# Patient Record
Sex: FEMALE | Race: WHITE | NOT HISPANIC OR LATINO | Employment: FULL TIME | URBAN - METROPOLITAN AREA
[De-identification: names, ages, dates, MRNs, and addresses within clinical notes are randomized per-mention and may not be internally consistent; named-entity substitution may affect disease eponyms.]

---

## 2017-10-26 ENCOUNTER — HISTORICAL (OUTPATIENT)
Dept: RADIOLOGY | Facility: HOSPITAL | Age: 42
End: 2017-10-26

## 2017-10-26 LAB
ALBUMIN SERPL-MCNC: 3.3 GM/DL (ref 3.4–5)
ALBUMIN/GLOB SERPL: 1 {RATIO}
ALP SERPL-CCNC: 82 UNIT/L (ref 38–126)
ALT SERPL-CCNC: 18 UNIT/L (ref 12–78)
AST SERPL-CCNC: 8 UNIT/L (ref 15–37)
BILIRUB SERPL-MCNC: 0.3 MG/DL (ref 0.2–1)
BILIRUBIN DIRECT+TOT PNL SERPL-MCNC: 0.1 MG/DL (ref 0–0.2)
BILIRUBIN DIRECT+TOT PNL SERPL-MCNC: 0.2 MG/DL (ref 0–0.8)
BUN SERPL-MCNC: 16 MG/DL (ref 7–18)
CALCIUM SERPL-MCNC: 8.4 MG/DL (ref 8.5–10.1)
CHLORIDE SERPL-SCNC: 103 MMOL/L (ref 98–107)
CHOLEST SERPL-MCNC: 146 MG/DL (ref 0–200)
CHOLEST/HDLC SERPL: 2.7 {RATIO} (ref 0–4)
CO2 SERPL-SCNC: 26 MMOL/L (ref 21–32)
CREAT SERPL-MCNC: 0.9 MG/DL (ref 0.55–1.02)
ERYTHROCYTE [DISTWIDTH] IN BLOOD BY AUTOMATED COUNT: 12.5 % (ref 11.5–17)
GLOBULIN SER-MCNC: 3.4 GM/DL (ref 2.4–3.5)
GLUCOSE SERPL-MCNC: 108 MG/DL (ref 74–106)
HCT VFR BLD AUTO: 46 % (ref 37–47)
HDLC SERPL-MCNC: 54 MG/DL (ref 35–60)
HGB BLD-MCNC: 15.1 GM/DL (ref 12–16)
LDLC SERPL CALC-MCNC: 56 MG/DL (ref 0–129)
MCH RBC QN AUTO: 28.7 PG (ref 27–31)
MCHC RBC AUTO-ENTMCNC: 32.8 GM/DL (ref 33–36)
MCV RBC AUTO: 87.5 FL (ref 80–94)
PLATELET # BLD AUTO: 312 X10(3)/MCL (ref 130–400)
PMV BLD AUTO: 9 FL (ref 9.4–12.4)
POTASSIUM SERPL-SCNC: 4.7 MMOL/L (ref 3.5–5.1)
PROT SERPL-MCNC: 6.7 GM/DL (ref 6.4–8.2)
RBC # BLD AUTO: 5.26 X10(6)/MCL (ref 4.2–5.4)
SODIUM SERPL-SCNC: 139 MMOL/L (ref 136–145)
TRIGL SERPL-MCNC: 178 MG/DL (ref 30–150)
TSH SERPL-ACNC: 3.14 MIU/ML (ref 0.36–3.74)
VLDLC SERPL CALC-MCNC: 36 MG/DL
WBC # SPEC AUTO: 8 X10(3)/MCL (ref 4.5–11.5)

## 2019-01-03 ENCOUNTER — HISTORICAL (OUTPATIENT)
Dept: RADIOLOGY | Facility: HOSPITAL | Age: 44
End: 2019-01-03

## 2019-01-14 ENCOUNTER — HISTORICAL (OUTPATIENT)
Dept: RADIOLOGY | Facility: HOSPITAL | Age: 44
End: 2019-01-14

## 2019-01-29 ENCOUNTER — HISTORICAL (OUTPATIENT)
Dept: RADIOLOGY | Facility: HOSPITAL | Age: 44
End: 2019-01-29

## 2019-07-15 ENCOUNTER — HISTORICAL (OUTPATIENT)
Dept: RADIOLOGY | Facility: HOSPITAL | Age: 44
End: 2019-07-15

## 2021-03-23 ENCOUNTER — HISTORICAL (OUTPATIENT)
Dept: RADIOLOGY | Facility: HOSPITAL | Age: 46
End: 2021-03-23

## 2021-09-23 ENCOUNTER — HISTORICAL (OUTPATIENT)
Dept: ADMINISTRATIVE | Facility: HOSPITAL | Age: 46
End: 2021-09-23

## 2021-10-18 LAB
PAP RECOMMENDATION EXT: NORMAL
PAP SMEAR: NORMAL

## 2022-04-10 ENCOUNTER — HISTORICAL (OUTPATIENT)
Dept: ADMINISTRATIVE | Facility: HOSPITAL | Age: 47
End: 2022-04-10

## 2022-04-28 VITALS
BODY MASS INDEX: 34.97 KG/M2 | SYSTOLIC BLOOD PRESSURE: 132 MMHG | WEIGHT: 236.13 LBS | DIASTOLIC BLOOD PRESSURE: 85 MMHG | HEIGHT: 69 IN

## 2022-10-24 DIAGNOSIS — Z12.31 ENCOUNTER FOR SCREENING MAMMOGRAM FOR BREAST CANCER: Primary | ICD-10-CM

## 2022-11-01 ENCOUNTER — HOSPITAL ENCOUNTER (OUTPATIENT)
Dept: RADIOLOGY | Facility: HOSPITAL | Age: 47
Discharge: HOME | End: 2022-11-01
Attending: OBSTETRICS & GYNECOLOGY
Payer: COMMERCIAL

## 2022-11-01 DIAGNOSIS — Z12.31 ENCOUNTER FOR SCREENING MAMMOGRAM FOR BREAST CANCER: ICD-10-CM

## 2022-11-01 PROCEDURE — 77063 BREAST TOMOSYNTHESIS BI: CPT | Mod: 26,,, | Performed by: RADIOLOGY

## 2022-11-01 PROCEDURE — 77063 MAMMO DIGITAL SCREENING BILAT WITH TOMO: ICD-10-PCS | Mod: 26,,, | Performed by: RADIOLOGY

## 2022-11-01 PROCEDURE — 77067 SCR MAMMO BI INCL CAD: CPT | Mod: TC

## 2022-11-01 PROCEDURE — 77067 SCR MAMMO BI INCL CAD: CPT | Mod: 26,,, | Performed by: RADIOLOGY

## 2022-11-01 PROCEDURE — 77067 MAMMO DIGITAL SCREENING BILAT WITH TOMO: ICD-10-PCS | Mod: 26,,, | Performed by: RADIOLOGY

## 2022-11-09 PROBLEM — K21.9 GERD (GASTROESOPHAGEAL REFLUX DISEASE): Status: ACTIVE | Noted: 2022-11-09

## 2022-11-09 PROBLEM — E66.812 CLASS 2 OBESITY WITH BODY MASS INDEX (BMI) OF 35.0 TO 35.9 IN ADULT: Status: ACTIVE | Noted: 2022-11-09

## 2022-11-09 PROBLEM — M50.30 DDD (DEGENERATIVE DISC DISEASE), CERVICAL: Status: ACTIVE | Noted: 2022-11-09

## 2022-11-09 PROBLEM — J30.9 AR (ALLERGIC RHINITIS): Status: ACTIVE | Noted: 2022-11-09

## 2022-11-09 PROBLEM — E66.9 CLASS 2 OBESITY WITH BODY MASS INDEX (BMI) OF 35.0 TO 35.9 IN ADULT: Status: ACTIVE | Noted: 2022-11-09

## 2023-01-07 ENCOUNTER — DOCUMENTATION ONLY (OUTPATIENT)
Dept: FAMILY MEDICINE | Facility: CLINIC | Age: 48
End: 2023-01-07
Payer: COMMERCIAL

## 2023-12-20 ENCOUNTER — HOSPITAL ENCOUNTER (OUTPATIENT)
Dept: RADIOLOGY | Facility: HOSPITAL | Age: 48
Discharge: HOME | End: 2023-12-20
Attending: OBSTETRICS & GYNECOLOGY
Payer: COMMERCIAL

## 2023-12-20 DIAGNOSIS — Z12.31 ENCOUNTER FOR SCREENING MAMMOGRAM FOR BREAST CANCER: ICD-10-CM

## 2023-12-20 PROCEDURE — 77063 MAMMO DIGITAL SCREENING BILAT WITH TOMO: ICD-10-PCS | Mod: 26,,, | Performed by: RADIOLOGY

## 2023-12-20 PROCEDURE — 77067 SCR MAMMO BI INCL CAD: CPT | Mod: 26,,, | Performed by: RADIOLOGY

## 2023-12-20 PROCEDURE — 77063 BREAST TOMOSYNTHESIS BI: CPT | Mod: 26,,, | Performed by: RADIOLOGY

## 2023-12-20 PROCEDURE — 77067 MAMMO DIGITAL SCREENING BILAT WITH TOMO: ICD-10-PCS | Mod: 26,,, | Performed by: RADIOLOGY

## 2023-12-20 PROCEDURE — 77067 SCR MAMMO BI INCL CAD: CPT | Mod: TC

## 2024-02-15 ENCOUNTER — OFFICE VISIT (OUTPATIENT)
Dept: SURGERY | Facility: CLINIC | Age: 49
End: 2024-02-15
Payer: COMMERCIAL

## 2024-02-15 VITALS
WEIGHT: 250.81 LBS | HEIGHT: 69 IN | HEART RATE: 80 BPM | BODY MASS INDEX: 37.15 KG/M2 | SYSTOLIC BLOOD PRESSURE: 116 MMHG | DIASTOLIC BLOOD PRESSURE: 76 MMHG

## 2024-02-15 DIAGNOSIS — K80.20 CALCULUS OF GALLBLADDER WITHOUT CHOLECYSTITIS WITHOUT OBSTRUCTION: Primary | ICD-10-CM

## 2024-02-15 PROCEDURE — 3008F BODY MASS INDEX DOCD: CPT | Mod: CPTII,,, | Performed by: NURSE PRACTITIONER

## 2024-02-15 PROCEDURE — 99204 OFFICE O/P NEW MOD 45 MIN: CPT | Mod: ,,, | Performed by: NURSE PRACTITIONER

## 2024-02-15 PROCEDURE — 1159F MED LIST DOCD IN RCRD: CPT | Mod: CPTII,,, | Performed by: NURSE PRACTITIONER

## 2024-02-15 PROCEDURE — 3074F SYST BP LT 130 MM HG: CPT | Mod: CPTII,,, | Performed by: NURSE PRACTITIONER

## 2024-02-15 PROCEDURE — 3078F DIAST BP <80 MM HG: CPT | Mod: CPTII,,, | Performed by: NURSE PRACTITIONER

## 2024-02-15 RX ORDER — CETIRIZINE HYDROCHLORIDE 10 MG/1
10 TABLET ORAL DAILY
COMMUNITY

## 2024-02-15 NOTE — PROGRESS NOTES
[facility] - General Surgery Services  [address] [place] [id number]  Phone: [phone]  Fax: [phone]     HISTORY & PHYSICAL  General Surgery  Dr. Jerel Ingram      Patient Name: Eugenio Siddiqui  YOB: 1975  Author: SHELLIE Rizo     Date: 02/15/2024                   SUBJECTIVE:     Chief Complaint/Reason for Admission:   Chief Complaint   Patient presents with    Consult     Calculus of gallbladder        History of Present Illness:  Ms. Eugenio Siddiqui is a 48 y.o. female who presents to clinic for surgical evaluation of gallbladder diease. Workup revealed cholelithiasis.     Pt reports 2.5 weeks ago she developed intermittent periumbilical stabbing pain. No relation to meals. Pain transitioned over the past 2 weeks to generalized abdominal pain and now occurs to RUQ  and travels down to RLQ.   Primary complaint is intermittent nausea. This symptom has worsened and now occurs daily.   She reports she has tried to follow a lower fat diet over the past 2 weeks to limit symptoms.   Associated with belching, worsening heartburn/reflux symptoms, and increase frequency of bowel movements. Normally has 1 BM per day but recently has notice 2-3 bowel movements per day, no diarrhea, constipation, or BRBPR.   No fever.   Normally takes GERD med Prn but recently had to start taking daily.     Positive symptoms:   Abdominal Pain RUQ and RLQ, Nausea, Belching, Heartburn, and Dyspepsia    Referring provider: Ayanna Ca NP     Patient Care Team:  ALVAREZ Anderson Jr., MD as PCP - General (Family Medicine)  Jerel Ingram MD as Surgeon (General Surgery)     Pertinent workup:  Abd US: gallstones    US Abdomen Complete  Narrative: EXAMINATION:  US ABDOMEN COMPLETE    CLINICAL HISTORY:  Generalized abdominal pain    TECHNIQUE:  Complete abdominal ultrasound (including pancreas, aorta, liver, gallbladder, common bile duct, IVC, kidneys, and  spleen) was performed.    COMPARISON:  None    FINDINGS:  The visualized portions of the pancreas, aorta and IVC are unremarkable.    The liver is echogenic and measures 14 cm.  No focal hepatic mass.  No intrahepatic ductal dilation.  The common bile duct measures 3 mm.  There are multiple small stones in the gallbladder.  No wall thickening or pericholecystic fluid.  The visualized right and left kidneys are unremarkable.  The spleen measures 11 cm.  Impression: Cholelithiasis.    Hepatic steatosis.    Electronically signed by: Norris Del Real  Date:    2024  Time:    09:59      Review of Systems:  12 point ROS negative except as stated in HPI    PAST HISTORY:     Past Medical History:   Diagnosis Date    GERD (gastroesophageal reflux disease)     It would only happen every now and then but nownis allost daily     Past Surgical History:   Procedure Laterality Date    neck sx   2021    SPINE SURGERY  Dec 8, 2021    Neck fusion surgery     Family History   Problem Relation Age of Onset    Cirrhosis Mother     Fibromyalgia Mother     Cancer Father         Skin    Schizophrenia Sister     Depression Sister         Manic depressent    Depression Brother         Bipolar     Social History     Socioeconomic History    Marital status:     Number of children: 2   Tobacco Use    Smoking status: Former     Current packs/day: 0.00     Types: Cigarettes     Quit date: 2003     Years since quittin.7    Smokeless tobacco: Never   Substance and Sexual Activity    Alcohol use: Yes     Comment: occasionally    Drug use: Never    Sexual activity: Yes     Partners: Male     Birth control/protection: Partner-Vasectomy     Social Determinants of Health     Financial Resource Strain: Low Risk  (2024)    Overall Financial Resource Strain (CARDIA)     Difficulty of Paying Living Expenses: Not hard at all   Food Insecurity: No Food Insecurity (2024)    Hunger Vital Sign     Worried About Running Out of  Food in the Last Year: Never true     Ran Out of Food in the Last Year: Never true   Transportation Needs: No Transportation Needs (2/14/2024)    PRAPARE - Transportation     Lack of Transportation (Medical): No     Lack of Transportation (Non-Medical): No   Physical Activity: Insufficiently Active (2/14/2024)    Exercise Vital Sign     Days of Exercise per Week: 3 days     Minutes of Exercise per Session: 30 min   Stress: No Stress Concern Present (2/14/2024)    [place] [employer] - Occupational Stress Questionnaire     Feeling of Stress : Not at all   Social Connections: Unknown (2/14/2024)    Social Connection and Isolation Panel [NHANES]     Frequency of Communication with Friends and Family: More than three times a week     Frequency of Social Gatherings with Friends and Family: More than three times a week     Active Member of Clubs or Organizations: Yes     Attends Club or Organization Meetings: More than 4 times per year     Marital Status:    Housing Stability: Low Risk  (2/14/2024)    Housing Stability Vital Sign     Unable to Pay for Housing in the Last Year: No     Number of Places Lived in the Last Year: 1     Unstable Housing in the Last Year: No       MEDICATIONS & ALLERGIES:     Current Outpatient Medications on File Prior to Visit   Medication Sig    azelastine (ASTELIN) 137 mcg (0.1 %) nasal spray USE 2 SPRAYS IN EACH NOSTRIL TWICE DAILY IN EACH NOSTRIL    cetirizine (ZYRTEC) 10 MG tablet Take 10 mg by mouth once daily.    cyclobenzaprine (FLEXERIL) 5 MG tablet Take 1 to 2 Tab(s) Oral up to every 8 hours as needed for Spasm    famotidine (PEPCID) 40 MG tablet     fluticasone propionate (FLONASE) 50 mcg/actuation nasal spray 1-2 sprays ( mcg total) by Each Nostril route once daily.    traMADoL (ULTRAM) 50 mg tablet Take 1 tablet (50 mg total) by mouth every 6 (six) hours.    [DISCONTINUED] montelukast (SINGULAIR) 10 mg tablet      No current facility-administered  "medications on file prior to visit.     Review of patient's allergies indicates:  No Known Allergies    OBJECTIVE:     Vitals:    02/15/24 0937   BP: 116/76   BP Location: Left arm   Patient Position: Sitting   Pulse: 80   Weight: 113.8 kg (250 lb 12.8 oz)   Height: 5' 9" (1.753 m)     Body mass index is 37.04 kg/m².    Physical Exam:  General:  Well developed, well nourished, no acute distress  HEENT:  Normocephalic, atraumatic, PERRL, EOMI, clear sclera, neck supple  CVS:  RRR, S1 and S2 normal, no murmurs, rubs, gallops  Resp:  Lungs clear to auscultation, no wheezes, rales, rhonchi, cough  GI:  Abdomen soft, non-tender, non-distended, normoactive bowel sounds, no masses  :   Deferred  MSK:  No muscle atrophy, cyanosis, peripheral edema, full range of motion  Skin:  No rashes, ulcers, erythema  Neuro:  CNII-XII grossly intact  Psych:  Alert and oriented to person, place, and time    Results:  I have independently reviewed all pertinent lab and radiologic studies relevant to general surgery.      VISIT DIAGNOSES:       ICD-10-CM ICD-9-CM   1. Calculus of gallbladder without cholecystitis without obstruction  K80.20 574.20       ASSESSMENT/PLAN:   Ms. Eugenio Siddiqui is a 48 y.o. female with symptomatic cholelithiasis.     Plan:  - Laparoscopic Cholecystectomy   - Pt requests a Friday due to work schedule.   - Recommended strict low fat diet until surgery to prevent attacks of pain. Call office ASAP if symptoms worsen.   - Possible Intraoperative Cholangiogram, Possible Hepatic Biopsy, and other indicated procedures. Operative findings to dictate procedures performed.   - Pre op labs from PCP. No additional pre op workup needed.  - Examined patient, discussed recommendations, discussed risks/benefits of procedure, obtained informed consent, and answered all questions.   - Dr. Ingram to meet patient AM of surgery and obtain informed consent.  - Counseling included differential diagnoses, treatment options, " risks and benefits, lifestyle changes, prognosis, and medications.    The patient was interactive, and verbalized understanding.    Kayleen Rodas, ANP

## 2024-02-19 DIAGNOSIS — K80.20 CALCULUS OF GALLBLADDER WITHOUT CHOLECYSTITIS WITHOUT OBSTRUCTION: Primary | ICD-10-CM

## 2024-03-27 NOTE — PROGRESS NOTES
Patient ID: [id number]     Chief Complaint: Post-op Evaluation (Lap rupali 1524)    HPI:     Eugenio Siddiqui is a 48 y.o. female here today for postoperative visit of lap cholecystectomy on 3/15/2024. Pt's incisions are healing well, denies any abd pain. having normal bowel movements, passing flatus, no NVD. no constipation. Reports reflux is even better but could be due to change in diet postop.     FINAL DIAGNOSIS      GALLBLADDER, CHOLECYSTECTOMY:      CHRONIC CHOLECYSTITIS WITH CHOLELITHIASIS.     Pathology Results  (Last 10 years)                 03/15/24 1104  Specimen to Pathology Final result            Patient Care Team:  ALVAREZ Anderson Jr., MD as PCP - General (Family Medicine)  Jerel Ingram MD as Surgeon (General Surgery)     Past Medical History:   Diagnosis Date    GERD (gastroesophageal reflux disease)     It would only happen every now and then but nownis allost daily        Past Surgical History:   Procedure Laterality Date    LAPAROSCOPIC CHOLECYSTECTOMY N/A 3/15/2024    Procedure: CHOLECYSTECTOMY, LAPAROSCOPIC;  Surgeon: Jerel Ingram MD;  Location: [facility];  Service: General;  Laterality: N/A;    neck sx   2021    SPINE SURGERY  Dec 8, 2021    Neck fusion surgery        Social History     Tobacco Use    Smoking status: Former     Current packs/day: 0.00     Types: Cigarettes     Quit date: 2003     Years since quittin.8    Smokeless tobacco: Never   Substance and Sexual Activity    Alcohol use: Yes     Comment: occasionally    Drug use: Never    Sexual activity: Yes     Partners: Male     Birth control/protection: Partner-Vasectomy        Current Outpatient Medications   Medication Instructions    azelastine (ASTELIN) 137 mcg (0.1 %) nasal spray USE 2 SPRAYS IN EACH NOSTRIL TWICE DAILY IN EACH NOSTRIL    cetirizine (ZYRTEC) 10 mg, Oral, Daily    cyclobenzaprine (FLEXERIL) 5 MG tablet Take 1 to 2 Tab(s) Oral up to every 8 hours as needed for Spasm    famotidine  "(PEPCID) 40 MG tablet No dose, route, or frequency recorded.    fluticasone propionate (FLONASE)  mcg, Each Nostril, Daily    HYDROcodone-acetaminophen (NORCO) 7.5-325 mg per tablet 1 tablet, Oral, Every 6 hours PRN    traMADoL (ULTRAM) 50 mg, Oral, Every 6 hours       Review of patient's allergies indicates:  No Known Allergies     Subjective:     Review of Systems    12 point review of systems conducted, negative except as stated in the history of present illness. See HPI for details.    Objective:     Visit Vitals  /86   Pulse 93   Ht 5' 9" (1.753 m)   Wt 113.4 kg (250 lb)   LMP  (LMP Unknown)   BMI 36.92 kg/m²       Physical Exam:  General:  Alert and oriented.    Respiratory:  Lungs are clear to auscultation, Respirations are non-labored, Breath sounds are equal.    Cardiovascular:  Normal rate, Regular rhythm, No murmur.    Gastrointestinal:  Soft, Non-tender, Non-distended, Normal bowel sounds        Musculoskeletal:  Normal range of motion, Normal strength.    Integumentary:  Warm, Dry, Pink.  Lap incisions healing well, no redness, swelling, or drainage noted.   Neurologic:  Alert, Oriented.    Psychiatric:  Cooperative.      Assessment:     1. Postoperative visit            Plan:     1. Postoperative visit       - numbness across mid abd should continue to get better with time but it will take time for the feeling to come back, this is due to nerve inflammation.   - Incisions healing well   - ok to take adhesive off with adhesive remover   - ok to get in tub, swim, wash with any soap, and still no lifting >15# x's 2 weeks.  - ok to advance diet as tolerated   - ED precautions given to patient and is to call the office immediately or go to the emergency room if she notices any redness, swelling, severe pain, increase nausea/vomiting, fever, irregular bowel movements or severe diarrhea      No future appointments.       KATHERINE Alanis      "

## 2024-03-28 ENCOUNTER — OFFICE VISIT (OUTPATIENT)
Dept: SURGERY | Facility: CLINIC | Age: 49
End: 2024-03-28
Payer: COMMERCIAL

## 2024-03-28 VITALS
HEART RATE: 93 BPM | WEIGHT: 250 LBS | BODY MASS INDEX: 37.03 KG/M2 | DIASTOLIC BLOOD PRESSURE: 86 MMHG | SYSTOLIC BLOOD PRESSURE: 121 MMHG | HEIGHT: 69 IN

## 2024-03-28 DIAGNOSIS — Z48.89 POSTOPERATIVE VISIT: Primary | ICD-10-CM

## 2024-03-28 PROCEDURE — 99024 POSTOP FOLLOW-UP VISIT: CPT | Mod: ,,, | Performed by: NURSE PRACTITIONER

## 2024-03-28 PROCEDURE — 3074F SYST BP LT 130 MM HG: CPT | Mod: CPTII,,, | Performed by: NURSE PRACTITIONER

## 2024-03-28 PROCEDURE — 1159F MED LIST DOCD IN RCRD: CPT | Mod: CPTII,,, | Performed by: NURSE PRACTITIONER

## 2024-03-28 PROCEDURE — 3079F DIAST BP 80-89 MM HG: CPT | Mod: CPTII,,, | Performed by: NURSE PRACTITIONER

## 2024-08-23 ENCOUNTER — APPOINTMENT (OUTPATIENT)
Dept: LAB | Facility: HOSPITAL | Age: 49
End: 2024-08-23
Attending: OBSTETRICS & GYNECOLOGY
Payer: COMMERCIAL

## 2024-08-23 DIAGNOSIS — Z00.00 ROUTINE GENERAL MEDICAL EXAMINATION AT A HEALTH CARE FACILITY: Primary | ICD-10-CM

## 2024-08-23 DIAGNOSIS — E55.9 AVITAMINOSIS D: ICD-10-CM

## 2024-08-23 LAB
25(OH)D3+25(OH)D2 SERPL-MCNC: 44 NG/ML (ref 30–80)
ALBUMIN SERPL-MCNC: 3.9 G/DL (ref 3.5–5)
ALBUMIN/GLOB SERPL: 1.3 RATIO (ref 1.1–2)
ALP SERPL-CCNC: 84 UNIT/L (ref 40–150)
ALT SERPL-CCNC: 23 UNIT/L (ref 0–55)
ANION GAP SERPL CALC-SCNC: 8 MEQ/L
AST SERPL-CCNC: 16 UNIT/L (ref 5–34)
BASOPHILS # BLD AUTO: 0.09 X10(3)/MCL
BASOPHILS NFR BLD AUTO: 1 %
BILIRUB SERPL-MCNC: 0.5 MG/DL
BUN SERPL-MCNC: 12.7 MG/DL (ref 7–18.7)
CALCIUM SERPL-MCNC: 9.5 MG/DL (ref 8.4–10.2)
CHLORIDE SERPL-SCNC: 103 MMOL/L (ref 98–107)
CHOLEST SERPL-MCNC: 184 MG/DL
CHOLEST/HDLC SERPL: 4 {RATIO} (ref 0–5)
CO2 SERPL-SCNC: 25 MMOL/L (ref 22–29)
CREAT SERPL-MCNC: 0.86 MG/DL (ref 0.55–1.02)
CREAT/UREA NIT SERPL: 15
EOSINOPHIL # BLD AUTO: 0.24 X10(3)/MCL (ref 0–0.9)
EOSINOPHIL NFR BLD AUTO: 2.7 %
ERYTHROCYTE [DISTWIDTH] IN BLOOD BY AUTOMATED COUNT: 13.1 % (ref 11.5–17)
ESTRADIOL SERPL HS-MCNC: 216 PG/ML
GFR SERPLBLD CREATININE-BSD FMLA CKD-EPI: >60 ML/MIN/1.73/M2
GLOBULIN SER-MCNC: 3 GM/DL (ref 2.4–3.5)
GLUCOSE SERPL-MCNC: 107 MG/DL (ref 74–100)
HCT VFR BLD AUTO: 45.6 % (ref 37–47)
HDLC SERPL-MCNC: 45 MG/DL (ref 35–60)
HGB BLD-MCNC: 15 G/DL (ref 12–16)
IMM GRANULOCYTES # BLD AUTO: 0.03 X10(3)/MCL (ref 0–0.04)
IMM GRANULOCYTES NFR BLD AUTO: 0.3 %
LDLC SERPL CALC-MCNC: 109 MG/DL (ref 50–140)
LYMPHOCYTES # BLD AUTO: 2.87 X10(3)/MCL (ref 0.6–4.6)
LYMPHOCYTES NFR BLD AUTO: 32.1 %
MCH RBC QN AUTO: 28.3 PG (ref 27–31)
MCHC RBC AUTO-ENTMCNC: 32.9 G/DL (ref 33–36)
MCV RBC AUTO: 86 FL (ref 80–94)
MONOCYTES # BLD AUTO: 0.59 X10(3)/MCL (ref 0.1–1.3)
MONOCYTES NFR BLD AUTO: 6.6 %
NEUTROPHILS # BLD AUTO: 5.13 X10(3)/MCL (ref 2.1–9.2)
NEUTROPHILS NFR BLD AUTO: 57.3 %
NRBC BLD AUTO-RTO: 0 %
PLATELET # BLD AUTO: 337 X10(3)/MCL (ref 130–400)
PMV BLD AUTO: 9.3 FL (ref 7.4–10.4)
POTASSIUM SERPL-SCNC: 4.3 MMOL/L (ref 3.5–5.1)
PROT SERPL-MCNC: 6.9 GM/DL (ref 6.4–8.3)
RBC # BLD AUTO: 5.3 X10(6)/MCL (ref 4.2–5.4)
SODIUM SERPL-SCNC: 136 MMOL/L (ref 136–145)
T4 FREE SERPL-MCNC: 0.76 NG/DL (ref 0.7–1.48)
TRIGL SERPL-MCNC: 151 MG/DL (ref 37–140)
TSH SERPL-ACNC: 2.27 UIU/ML (ref 0.35–4.94)
VLDLC SERPL CALC-MCNC: 30 MG/DL
WBC # BLD AUTO: 8.95 X10(3)/MCL (ref 4.5–11.5)

## 2024-08-23 PROCEDURE — 80053 COMPREHEN METABOLIC PANEL: CPT

## 2024-08-23 PROCEDURE — 82670 ASSAY OF TOTAL ESTRADIOL: CPT

## 2024-08-23 PROCEDURE — 85025 COMPLETE CBC W/AUTO DIFF WBC: CPT

## 2024-08-23 PROCEDURE — 82306 VITAMIN D 25 HYDROXY: CPT

## 2024-08-23 PROCEDURE — 36415 COLL VENOUS BLD VENIPUNCTURE: CPT

## 2024-08-23 PROCEDURE — 80061 LIPID PANEL: CPT

## 2024-08-23 PROCEDURE — 84443 ASSAY THYROID STIM HORMONE: CPT

## 2024-08-23 PROCEDURE — 84439 ASSAY OF FREE THYROXINE: CPT

## 2025-02-13 DIAGNOSIS — Z12.31 OTHER SCREENING MAMMOGRAM: Primary | ICD-10-CM

## 2025-02-18 ENCOUNTER — TELEPHONE (OUTPATIENT)
Dept: NEUROSURGERY | Facility: CLINIC | Age: 50
End: 2025-02-18
Payer: COMMERCIAL

## 2025-02-18 ENCOUNTER — HOSPITAL ENCOUNTER (OUTPATIENT)
Dept: RADIOLOGY | Facility: HOSPITAL | Age: 50
Discharge: HOME | End: 2025-02-18
Attending: OBSTETRICS & GYNECOLOGY
Payer: COMMERCIAL

## 2025-02-18 DIAGNOSIS — M51.362 DEGENERATION OF INTERVERTEBRAL DISC OF LUMBAR REGION WITH DISCOGENIC BACK PAIN AND LOWER EXTREMITY PAIN: Primary | ICD-10-CM

## 2025-02-18 DIAGNOSIS — Z12.31 OTHER SCREENING MAMMOGRAM: ICD-10-CM

## 2025-02-18 PROCEDURE — 77067 SCR MAMMO BI INCL CAD: CPT | Mod: TC

## 2025-02-18 NOTE — TELEPHONE ENCOUNTER
Please schedule the patient next available on either 's, my or Latoya's schedule.  I would recommend she attempted at least 1 injection with Dr. Richardson should he feel it warranted prior to her being seen in our clinic.  Thanks

## 2025-02-18 NOTE — TELEPHONE ENCOUNTER
"Patient was referred urgently to  by Dr. Victor Manuel Anderson on 02/18 for lumbar.    Per referring provider's note on 02/14/23:   "Chief Complaint: Back Pain  Patient presents here today for the above reason.    Back pain since October. Started after pulling an object. Re-injured in January. Has hx of bulging disc at L4-L5 dx 10 years ago. Went to PT for low back. Having Sciatica into Right leg down to her foot. Interested in having MRI of lumbar spine. Has appointment scheduled with Dr Richardson on 2/27/23  Currently on nabumetone 750 mg. Takes Flexeril and Tramadol.as needed.   Cervical Fusion on 12/8/2021."    Patient was treated with PT on and off since approx. 2023 with Ricky Barry.    Per referring provider's note on 01/28/25: "Pain in left leg and foot. PT has not been helping. Worse when sitting. MRI of L-Spine performed on 2/18/23. Low back pain with sciatica. Physical therapy has been ineffective. Will order an MRI of lumbar spine without contrast, follow-up to be determined."    Referring provider advised on 02/17/25: "Please advise that her MRI showed a synovial cyst at L4-L5 which causes left side stenosis. Has she seen a neurosurgeon? If not then I will place a referral, does she have a preference of provider?"    MRI lumbar performed on 02/13/25 reports: "Chronic 1-2 mm degenerative retrolisthesis of L3 with no acute malalignment or fracture.  Chronic lower thoracic and lumbar degenerative disc disease and facet arthrosis as detailed above with new synovial cyst at the medial aspect of the left L4-L5 facet joint which contributes to severe left lateral recess stenosis with possible impingement of the descending left L5 nerve.  Mild leftward spinal canal stenosis at L4-L5 with no high-grade spinal canal stenosis. Mild right neural foramen stenosis at L3-L4 and mild left neural foramen stenosis at L4-L5. Interval resolution of previously demonstrated small disc protrusion at L5-S1."    Provider- can " you please review imaging for severity and how to schedule?

## 2025-02-19 NOTE — TELEPHONE ENCOUNTER
Spoke with patient and she stated that she hasn't seen Dr. Richardson for her back, only her neck. She was told by Dr. Anderson that when the MRI revealed a cyst she should consult with neurosurgery instead of trying inj. She states that the pain is primarily in her LLE into the heel of her foot with numbness. It is the most prominent when she is waking up in the morning. She is taking Flexeril and Tramadol with minimal relief.

## 2025-03-11 NOTE — PROGRESS NOTES
[facility]  History & Physical  Neurosurgery      Eugenio Siddiqui   [id number]   1975     SUBJECTIVE:     CHIEF COMPLAINT:  Lower back and left leg pain      HPI:  Eugenio Siddiqui is a 49 y.o. female who presents for neurosurgical evaluation.  She has been referred to  by Dr. Victor Manuel Anderson in regards to her lumbar spine.  The patient began with lower back pain in October of 2022 due to the patient pulling on an object.  She re-injured the lower back in January 2023.  MRI of the lumbar spine without contrast was obtained on 2/13/2025.  At L4-5, there is left-sided lateral recess stenosis secondary to facet hypertrophy, ligamentous hypertrophy and synovial cyst.  The cyst displaces the left L5 nerve root.    The patient complains of lower back pain with pain radiating into the left posterior thigh, posterolateral lower leg, and plantar surface from the arch to heal.  Her pain increases with prolonged sitting and standing.  The symptoms are improved with stretching and exercises.  She points to the left SI joint as the area of pain.    In June of 2024, the patient was in [place].  She slept in a [place].  The following day, she sneezed and felt immediate pain in the lower back and left posterior leg.  Upon returning home, she underwent a course of physical therapy.  Due to persistent pain, the patient then underwent MRI of the lumbar spine.  Initially, the patient had weakness in the left leg.  However, this is no longer a problem.  Her symptoms have improved since the onset of pain.  She is not able to run.  She did [place] running through the airport.  She is able to participate in most activities she desires.  She is being very careful with lifting.  She was seen by Dr. Richardson.  She has not undergone injections.  She also has an appointment with Dr. Beau Irving's PA tomorrow.    In addition, the patient has a history of undergoing C4-5 ACDF on 12/18/2021 with Dr. Beau Jones.   She has experienced recurrent neck pain with pain radiating into the right trapezius muscle in the past 2-3 months.  She has a history of low back pain and right leg pain approximately 10 years ago.  She was found to have a herniated disc.  She improved with a course of physical therapy.  The patient denies disturbances in bowel or bladder function.  There is no difficulty with balance.       Past Medical History:   Diagnosis Date    Chronic midline low back pain with bilateral sciatica     Class 2 obesity due to excess calories without serious comorbidity with body mass index (BMI) of 35.0 to 35.9 in adult     GERD (gastroesophageal reflux disease) 2020    It would only happen every now and then but nownis allost daily    Hypertriglyceridemia        Past Surgical History:   Procedure Laterality Date    CERVICAL FUSION  2021    C4-5 ACDF.  Dr. Beau Jones    LAPAROSCOPIC CHOLECYSTECTOMY N/A 03/15/2024    Procedure: CHOLECYSTECTOMY, LAPAROSCOPIC;  Surgeon: Jerel Ingram MD;  Location: [facility];  Service: General;  Laterality: N/A;       Family History   Problem Relation Name Age of Onset    Cirrhosis Mother      Fibromyalgia Mother      Cancer Father Seymour         Skin    Schizophrenia Sister Scarlett     Depression Sister Scarlett         Manic depressent    Depression Brother Jensen         Bipolar       Social History     Socioeconomic History    Marital status:     Number of children: 2   Occupational History    Occupation: [employer]   Tobacco Use    Smoking status: Former     Current packs/day: 0.00     Types: Cigarettes     Quit date: 2003     Years since quittin.8    Smokeless tobacco: Never   Substance and Sexual Activity    Alcohol use: Yes     Comment: occasionally    Drug use: Never    Sexual activity: Yes     Partners: Male     Birth control/protection: Partner-Vasectomy     Social Drivers of Health     Financial Resource Strain: Low Risk  (2024)    Overall  Financial Resource Strain (CARDIA)     Difficulty of Paying Living Expenses: Not hard at all   Food Insecurity: No Food Insecurity (2/14/2024)    Hunger Vital Sign     Worried About Running Out of Food in the Last Year: Never true     Ran Out of Food in the Last Year: Never true   Transportation Needs: No Transportation Needs (2/14/2024)    PRAPARE - Transportation     Lack of Transportation (Medical): No     Lack of Transportation (Non-Medical): No   Physical Activity: Insufficiently Active (2/14/2024)    Exercise Vital Sign     Days of Exercise per Week: 3 days     Minutes of Exercise per Session: 30 min   Stress: No Stress Concern Present (2/14/2024)    [employer] - Occupational Stress Questionnaire     Feeling of Stress : Not at all   Housing Stability: Low Risk  (2/14/2024)    Housing Stability Vital Sign     Unable to Pay for Housing in the Last Year: No     Number of Places Lived in the Last Year: 1     Unstable Housing in the Last Year: No       Current Outpatient Medications   Medication Instructions    azelastine (ASTELIN) 274 mcg, Nasal, 2 times daily    cetirizine (ZYRTEC) 10 mg, Daily    cyclobenzaprine (FLEXERIL) 5 MG tablet Take 1 to 2 Tab(s) Oral up to every 8 hours as needed for Spasm    famotidine (PEPCID) 40 mg, Oral, Nightly PRN    fluticasone propionate (FLONASE)  mcg, Each Nostril, Daily    traMADoL (ULTRAM) 50 mg, Oral, Every 6 hours PRN       Review of patient's allergies indicates:  No Known Allergies       Review of Systems   Constitutional:  Negative for chills and fever.   HENT:  Negative for nosebleeds and sore throat.    Eyes:  Negative for pain and visual disturbance.   Respiratory:  Negative for cough, chest tightness and shortness of breath.    Cardiovascular:  Negative for chest pain.   Gastrointestinal:  Negative for diarrhea, nausea and vomiting.   Genitourinary:  Negative for difficulty urinating, dysuria and hematuria.   Musculoskeletal:   "Positive for back pain. Negative for gait problem and myalgias.   Skin:  Negative for rash.   Neurological:  Positive for numbness. Negative for dizziness, facial asymmetry, weakness and headaches.   Psychiatric/Behavioral:  Negative for confusion and sleep disturbance. The patient is not nervous/anxious.        OBJECTIVE:       Visit Vitals  /84 (BP Location: Right arm, Patient Position: Sitting)   Pulse 98   Resp 16   Ht 5' 9" (1.753 m)   Wt 108.7 kg (239 lb 9.6 oz)   BMI 35.38 kg/m²        General:  Pleasant, Well-groomed, overweight.    CV:  Neck is supple.  There are no carotid bruits.  Heart has regular rate and rhythm.    Lungs:  Lungs are clear to auscultation bilaterally with non-labored respirations.    Abdomen:  Soft, non-tender, non-distended    Musculoskeletal:   Lumbar ROM:  Full, lower back pain is increased with extension and rising from a flexed position..  Straight leg raise is negative bilaterally.  Crossed straight leg raise is negative bilaterally.  Masrha's test is negative bilaterally.  Hip rotation is negative bilaterally.    Neurological:  The patient is awake, alert, and oriented in all 4 spheres.  Muscle strength against resistance:  Strength  Deltoids Biceps Triceps Wrist Extension Wrist Flexion Intrinsics   Upper: R 5/5 5/5 5/5 5/5  5/5    L 5/5 5/5 5/5 5/5  5/5     Iliopsoas Quadriceps Knee  Flexion Tibialis  anterior Gastro- cnemius EHL   Lower: R 5/5 5/5 5/5 5/5 5/5 5/5    L 5/5 5/5 5/5 5/5 5/5 5/5   Sensation is intact to primary modalities in bilateral upper and lower extremities.  Bhat's sign is positive on the left, negative on the right.  Toes are downgoing to Babinski.  There is A1 be clonus on the left.  Reflexes:   Right Left   Triceps 3+ 2+   Biceps 3+ 2+   Brachioradialis 3+ 3+   Patellar 3+ 3+   Achilles 3+ 3+   Gait is normal.  She is able to walk on heels and toes without difficulty.  Walking on heels causes pain at her left heel.      Imaging:  All pertinent " neuroimaging independently reviewed. Discussed these findings in detail with the patient.      ASSESSMENT:     1. Synovial cyst of lumbar facet joint        2. Lumbar stenosis with neurogenic claudication        3. Degeneration of intervertebral disc of lumbar region with discogenic back pain and lower extremity pain  Ambulatory referral/consult to Neurosurgery      4. Cervical myelopathy with cervical radiculopathy          PLAN:     Options were discussed at length with the patient.  She is able to manage with her lower back and leg pain symptoms at this time.  With this being the case, she should continue with conservative measures rather than considering surgery at this time.  I think it would be reasonable to get backward Dr. Templeton seen and discuss proceeding with epidural injections.  She is not interested in surgery at this point.  She will continue with her core strengthening and stretching exercises.    I am concerned about her hyperreflexia.  She is unsure if she exhibited brisk reflexes prior to her cervical fusion.  She is to see RICKY Irving for Dr. Beau Jones, tomorrow.  I asked her to discuss this with him so that he can look back at his notes and see if she was hyperreflexive at that time.  If she was not, we need to move forward with obtaining cervical MRI to assess for spinal cord compression.  If she was hyperreflexive, we do not need to proceed with MRI at this time.  She will also likely obtain lumbar x-rays with flexion-extension views tomorrow I have asked her to bring that by the office so I can review.  She will contact me to discuss her visit with Aristides after she has seen.      A total of 44 minutes was spent face-to-face with the patient during this encounter.  Over half of that time was spent on counseling and coordination of care.  An additional  minutes were used to review the patient's chart including lumbar MRI images and report, compare with prior lumbar MRI images, prior cervical x-ray  images, notes from Dr. Anderson, and work on office note.      Latoya Sam PA-C      Disclaimer:  This note is prepared using voice recognition software and as such is likely to have errors despite attempts at proofreading.

## 2025-03-12 ENCOUNTER — OFFICE VISIT (OUTPATIENT)
Dept: NEUROSURGERY | Facility: CLINIC | Age: 50
End: 2025-03-12
Payer: COMMERCIAL

## 2025-03-12 VITALS
DIASTOLIC BLOOD PRESSURE: 84 MMHG | WEIGHT: 239.63 LBS | RESPIRATION RATE: 16 BRPM | SYSTOLIC BLOOD PRESSURE: 128 MMHG | HEART RATE: 98 BPM | HEIGHT: 69 IN | BODY MASS INDEX: 35.49 KG/M2

## 2025-03-12 DIAGNOSIS — M71.38 SYNOVIAL CYST OF LUMBAR FACET JOINT: Primary | ICD-10-CM

## 2025-03-12 DIAGNOSIS — M54.12 CERVICAL MYELOPATHY WITH CERVICAL RADICULOPATHY: ICD-10-CM

## 2025-03-12 DIAGNOSIS — M48.062 LUMBAR STENOSIS WITH NEUROGENIC CLAUDICATION: ICD-10-CM

## 2025-03-12 DIAGNOSIS — G95.9 CERVICAL MYELOPATHY WITH CERVICAL RADICULOPATHY: ICD-10-CM

## 2025-03-12 DIAGNOSIS — M51.362 DEGENERATION OF INTERVERTEBRAL DISC OF LUMBAR REGION WITH DISCOGENIC BACK PAIN AND LOWER EXTREMITY PAIN: ICD-10-CM

## 2025-03-12 PROCEDURE — 1159F MED LIST DOCD IN RCRD: CPT | Mod: CPTII,,, | Performed by: PHYSICIAN ASSISTANT

## 2025-03-12 PROCEDURE — 3074F SYST BP LT 130 MM HG: CPT | Mod: CPTII,,, | Performed by: PHYSICIAN ASSISTANT

## 2025-03-12 PROCEDURE — 3008F BODY MASS INDEX DOCD: CPT | Mod: CPTII,,, | Performed by: PHYSICIAN ASSISTANT

## 2025-03-12 PROCEDURE — 99204 OFFICE O/P NEW MOD 45 MIN: CPT | Mod: ,,, | Performed by: PHYSICIAN ASSISTANT

## 2025-03-12 PROCEDURE — 1160F RVW MEDS BY RX/DR IN RCRD: CPT | Mod: CPTII,,, | Performed by: PHYSICIAN ASSISTANT

## 2025-03-12 PROCEDURE — 3079F DIAST BP 80-89 MM HG: CPT | Mod: CPTII,,, | Performed by: PHYSICIAN ASSISTANT

## 2025-06-03 ENCOUNTER — PATIENT MESSAGE (OUTPATIENT)
Dept: SURGERY | Facility: CLINIC | Age: 50
End: 2025-06-03
Payer: COMMERCIAL

## 2025-06-03 NOTE — TELEPHONE ENCOUNTER
Spoke to pt. She is having sharp pains to her mid to lower right abdomen. After talking it through it seems to be once a month. I asked her if it was around the same time she would be starting her cycle. She states that she thinks so and will pay more attention to that the next time. The pains come on and go away as fast as it comes on. Sometimes will have lingering soreness. No fevers. No Nausea/vomiting. She is having diarrhea after eating sometimes. That's probably related to her gallbladder. We did also discuss appendicitis. She states it doesn't feel like a pulled muscle like her PCP is suggesting. I told her she can call her PCP and ask for a CT abd/pelvis to possibly rule out appendicitis or maybe something else going on. She will do that but will probably wait to see if its related to her cycle. Enc her to call if pains become worse or more frequent or if needs. She verbalized understanding